# Patient Record
(demographics unavailable — no encounter records)

---

## 2025-06-27 NOTE — HISTORY OF PRESENT ILLNESS
[FreeTextEntry1] : Bobby Oliva is a 56- year- old female with Hypercholesterolemia comes for cardiac evaluation. Denies any chest pain or palpitations. Mild shortness of breath on exertion which is relieved with rest in few minutes of few months duration. Family history of premature CAD.

## 2025-06-27 NOTE — DISCUSSION/SUMMARY
[FreeTextEntry1] : In a summary Bobby Oliva is a middle- aged- female with Hypercholesterolemia, low cholesterol diet. Shortness of breath on exertion, Echo done showed normal LV systolic function. Echo results explained. Exercise stress test to rule out ischemia. Further plan based on stress test results.